# Patient Record
Sex: MALE | Race: WHITE | Employment: OTHER | URBAN - METROPOLITAN AREA
[De-identification: names, ages, dates, MRNs, and addresses within clinical notes are randomized per-mention and may not be internally consistent; named-entity substitution may affect disease eponyms.]

---

## 2017-08-14 ENCOUNTER — TELEPHONE (OUTPATIENT)
Dept: FAMILY MEDICINE CLINIC | Facility: CLINIC | Age: 72
End: 2017-08-14

## 2017-11-22 ENCOUNTER — TELEPHONE (OUTPATIENT)
Dept: FAMILY MEDICINE CLINIC | Facility: CLINIC | Age: 72
End: 2017-11-22

## 2017-11-29 ENCOUNTER — OFFICE VISIT (OUTPATIENT)
Dept: FAMILY MEDICINE CLINIC | Facility: CLINIC | Age: 72
End: 2017-11-29

## 2017-11-29 VITALS
HEART RATE: 76 BPM | BODY MASS INDEX: 30.29 KG/M2 | RESPIRATION RATE: 14 BRPM | WEIGHT: 236 LBS | SYSTOLIC BLOOD PRESSURE: 136 MMHG | DIASTOLIC BLOOD PRESSURE: 78 MMHG | HEIGHT: 74 IN | TEMPERATURE: 99 F

## 2017-11-29 DIAGNOSIS — M1A.09X0 IDIOPATHIC CHRONIC GOUT OF MULTIPLE SITES WITHOUT TOPHUS: ICD-10-CM

## 2017-11-29 DIAGNOSIS — Z00.00 ANNUAL PHYSICAL EXAM: Primary | ICD-10-CM

## 2017-11-29 DIAGNOSIS — Z23 NEED FOR VACCINATION: ICD-10-CM

## 2017-11-29 DIAGNOSIS — C61 PROSTATE CANCER METASTATIC TO LUNG (HCC): ICD-10-CM

## 2017-11-29 DIAGNOSIS — C78.00 PROSTATE CANCER METASTATIC TO LUNG (HCC): ICD-10-CM

## 2017-11-29 DIAGNOSIS — N18.30 CKD (CHRONIC KIDNEY DISEASE) STAGE 3, GFR 30-59 ML/MIN (HCC): ICD-10-CM

## 2017-11-29 DIAGNOSIS — E78.6 LOW HDL (UNDER 40): ICD-10-CM

## 2017-11-29 DIAGNOSIS — Z00.00 ENCOUNTER FOR ANNUAL HEALTH EXAMINATION: ICD-10-CM

## 2017-11-29 PROCEDURE — G0439 PPPS, SUBSEQ VISIT: HCPCS | Performed by: FAMILY MEDICINE

## 2017-11-29 PROCEDURE — G0009 ADMIN PNEUMOCOCCAL VACCINE: HCPCS | Performed by: FAMILY MEDICINE

## 2017-11-29 PROCEDURE — 90670 PCV13 VACCINE IM: CPT | Performed by: FAMILY MEDICINE

## 2017-11-29 NOTE — PATIENT INSTRUCTIONS
Donald Arzola's SCREENING SCHEDULE   Tests on this list are recommended by your physician but may not be covered, or covered at this frequency, by your insurer. Please check with your insurance carrier before scheduling to verify coverage.     PREVENTATI criteria:   • Men who are 73-68 years old and have smoked more than 100 cigarettes in their lifetime   • Anyone with a family history    Colorectal Cancer Screening Covered up to Age 76     Colonoscopy Screen   Covered every 10 years- more often if abnorma retarded   Persons who live in the same house as a HepB virus carrier   Homosexual men   Illicit injectable drug abusers     Tetanus Toxoid- Only covered with a cut with metal- TD and TDaP Not covered by Medicare Part B) No orders found for this or any pre

## 2017-11-29 NOTE — PROGRESS NOTES
HPI:   Nicho King is a 71year old male who presents for a Medicare Subsequent Annual Wellness visit (Pt already had Initial Annual Wellness).     Wife with cancer and lots of stress'  Declined flu and pnuemonia  His last annual assessment has been over Review of Systems   Constitutional: Negative. Negative for activity change, appetite change, chills and fever. HENT: Negative. Eyes: Negative. Respiratory: Negative. Negative for shortness of breath. Cardiovascular: Negative.   Negative for c range of motion. Neck supple. Normal carotid pulses present. No edema present. No thyroid mass and no thyromegaly present. Cardiovascular: Normal rate, regular rhythm, S1 normal, S2 normal, normal heart sounds and intact distal pulses.   Exam reveals no g Gout    Current Assessment & Plan     Stable overall    Lab Results  Component Value Date   URIC 6.6 03/06/2017   CREATSERUM 1.51 (H) 03/06/2017                  Genitourinary    CKD (chronic kidney disease) stage 3, GFR 30-59 ml/min    Overview     GFR in a female age 47-67, do you take aspirin?: (P) No    Have you had any immunizations at another office such as Influenza, Hepatitis B, Tetanus, or Pneumococcal?: (P) No     Functional Ability     Bathing or Showering: (P) Able without help    Toileting: (P) What day of the week is this?: Correct    What month is it?: Correct    What year is it?: Correct    Recall \"Ball\": Correct    Recall \"Flag\": Correct    Recall \"Tree\": Correct       This section provided for quick review of chart, separate sheet to visit. Tetanus No orders found for this or any previous visit.          SPECIFIC DISEASE MONITORING Internal Lab or Procedure External Lab or Procedure   Annual Monitoring of Persistent     Medications (ACE/ARB, digoxin diuretics, anticonvulsants.)    P

## 2017-11-30 NOTE — ASSESSMENT & PLAN NOTE
Stable overall    Lab Results  Component Value Date   URIC 6.6 03/06/2017   CREATSERUM 1.51 (H) 03/06/2017

## 2018-01-15 ENCOUNTER — OFFICE VISIT (OUTPATIENT)
Dept: FAMILY MEDICINE CLINIC | Facility: CLINIC | Age: 73
End: 2018-01-15

## 2018-01-15 VITALS
RESPIRATION RATE: 16 BRPM | HEART RATE: 80 BPM | BODY MASS INDEX: 30 KG/M2 | WEIGHT: 235 LBS | TEMPERATURE: 97 F | DIASTOLIC BLOOD PRESSURE: 72 MMHG | SYSTOLIC BLOOD PRESSURE: 136 MMHG

## 2018-01-15 DIAGNOSIS — L02.11 ABSCESS, NECK: Primary | ICD-10-CM

## 2018-01-15 DIAGNOSIS — L72.3 INFLAMED SEBACEOUS CYST: ICD-10-CM

## 2018-01-15 PROCEDURE — 10060 I&D ABSCESS SIMPLE/SINGLE: CPT | Performed by: FAMILY MEDICINE

## 2018-01-15 PROCEDURE — 99212 OFFICE O/P EST SF 10 MIN: CPT | Performed by: FAMILY MEDICINE

## 2018-01-16 NOTE — PROGRESS NOTES
Patient presents with:  Abscess (integumentary): L side of neck x 2 weeks      HPI:   This is a 71year old male coming in for growing lesion on the left side of his neck, it has been growing over the last 2 weeks, no real pain but is concerned about absce No significant blood loss. Bandages placed. No specimen sent to pathology      Assessment: Inflamed sebaceous cyst with infection, no need for antibiotics at this time will watch closely    Plan: Incision and Drainage performed.      Pathology not sen

## 2018-05-17 ENCOUNTER — TELEPHONE (OUTPATIENT)
Dept: FAMILY MEDICINE CLINIC | Facility: CLINIC | Age: 73
End: 2018-05-17

## 2018-11-12 ENCOUNTER — LAB ENCOUNTER (OUTPATIENT)
Dept: LAB | Age: 73
End: 2018-11-12
Payer: MEDICARE

## 2018-11-12 DIAGNOSIS — Z01.818 PRE-OP EXAM: Primary | ICD-10-CM

## 2018-11-12 PROCEDURE — 80048 BASIC METABOLIC PNL TOTAL CA: CPT

## 2019-03-13 ENCOUNTER — TELEPHONE (OUTPATIENT)
Dept: FAMILY MEDICINE CLINIC | Facility: CLINIC | Age: 74
End: 2019-03-13

## 2019-05-29 ENCOUNTER — LAB ENCOUNTER (OUTPATIENT)
Dept: LAB | Age: 74
End: 2019-05-29
Attending: PHYSICIAN ASSISTANT
Payer: MEDICARE

## 2019-05-29 DIAGNOSIS — K91.1 POSTGASTRIC SURGERY SYNDROMES: ICD-10-CM

## 2019-05-29 DIAGNOSIS — C61 MALIGNANT NEOPLASM OF PROSTATE (HCC): Primary | ICD-10-CM

## 2019-05-29 PROCEDURE — 84520 ASSAY OF UREA NITROGEN: CPT

## 2019-05-29 PROCEDURE — 82565 ASSAY OF CREATININE: CPT

## 2019-07-01 PROBLEM — C78.00 SECONDARY MALIGNANT NEOPLASM OF LUNG (HCC): Status: ACTIVE | Noted: 2018-03-23

## 2019-07-03 ENCOUNTER — OFFICE VISIT (OUTPATIENT)
Dept: FAMILY MEDICINE CLINIC | Facility: CLINIC | Age: 74
End: 2019-07-03
Payer: MEDICARE

## 2019-07-03 VITALS
HEIGHT: 74 IN | WEIGHT: 235 LBS | RESPIRATION RATE: 14 BRPM | HEART RATE: 80 BPM | TEMPERATURE: 98 F | DIASTOLIC BLOOD PRESSURE: 72 MMHG | BODY MASS INDEX: 30.16 KG/M2 | SYSTOLIC BLOOD PRESSURE: 130 MMHG

## 2019-07-03 DIAGNOSIS — Z00.00 ENCOUNTER FOR ANNUAL HEALTH EXAMINATION: ICD-10-CM

## 2019-07-03 DIAGNOSIS — C61 PROSTATE CANCER METASTATIC TO LUNG (HCC): Primary | ICD-10-CM

## 2019-07-03 DIAGNOSIS — N18.30 CKD (CHRONIC KIDNEY DISEASE) STAGE 3, GFR 30-59 ML/MIN (HCC): ICD-10-CM

## 2019-07-03 DIAGNOSIS — C78.00 PROSTATE CANCER METASTATIC TO LUNG (HCC): Primary | ICD-10-CM

## 2019-07-03 DIAGNOSIS — Z23 NEED FOR VACCINATION: ICD-10-CM

## 2019-07-03 DIAGNOSIS — E78.6 LOW HDL (UNDER 40): ICD-10-CM

## 2019-07-03 PROCEDURE — G0439 PPPS, SUBSEQ VISIT: HCPCS | Performed by: FAMILY MEDICINE

## 2019-07-03 PROCEDURE — 90732 PPSV23 VACC 2 YRS+ SUBQ/IM: CPT | Performed by: FAMILY MEDICINE

## 2019-07-03 PROCEDURE — G0009 ADMIN PNEUMOCOCCAL VACCINE: HCPCS | Performed by: FAMILY MEDICINE

## 2019-07-03 NOTE — PROGRESS NOTES
HPI:   Ben Anthony is a 68year old male who presents for a Medicare Subsequent Annual Wellness visit (Pt already had Initial Annual Wellness). Doing well, hx prostate CA x 12 years, got XRT and PSA dropped considerably. PSA has gradually increased. Low HDL (under 40)     CKD (chronic kidney disease) stage 3, GFR 30-59 ml/min (HCC)     Secondary malignant neoplasm of lung ([facility] [address]    Wt Readings from Last 3 Encounters:  07/03/19 : 235 lb  01/15/18 : 235 lb  11/29/17 : 236 lb     Last Cholesterol Labs: Genitourinary: Negative. Negative for dysuria and difficulty urinating. Musculoskeletal: Negative for joint swelling. Skin: Negative. Negative for rash. Neurological: Negative. Negative for dizziness.         EXAM:   /72   Pulse 80   Temp 9 11/29/2017   Pended Date(s) Pended   • Pneumovax 23 07/03/2019   Deferred Date(s) Deferred   • >=3 YRS TRI  MULTIDOSE VIAL (23815) FLU CLINIC 11/29/2017   • Pneumococcal (Prevnar 13) 10/07/2016        ASSESSMENT AND OTHER RELEVANT CHRONIC CONDITIONS:   Trisha Turpin well-being?: Social Interaction;Games;Puzzles; Visiting Friends; Visiting Family    This section provided for quick review of chart, separate sheet to patient  [address] Internal Lab or Procedure External Lab or Procedur for the mentally retarded   Persons who live in the same house as a HepB virus carrier   Homosexual men   Illicit injectable drug abusers     Tetanus Toxoid  Only covered with a cut with metal- TD and TDaP Not covered by Medicare Part B) No vaccine history

## 2019-07-03 NOTE — PATIENT INSTRUCTIONS
Donald Arzola's SCREENING SCHEDULE   Tests on this list are recommended by your physician but may not be covered, or covered at this frequency, by your insurer. Please check with your insurance carrier before scheduling to verify coverage.     PREVENTATI criteria:   • Men who are 73-68 years old and have smoked more than 100 cigarettes in their lifetime   • Anyone with a family history    Colorectal Cancer Screening Covered up to Age 76     Colonoscopy Screen   Covered every 10 years- more often if abnorma institutions for the mentally retarded   Persons who live in the same house as a HepB virus carrier   Homosexual men   Illicit injectable drug abusers     Tetanus Toxoid- Only covered with a cut with metal- TD and TDaP Not covered by Medicare Part B) No or

## 2021-03-12 DIAGNOSIS — Z23 NEED FOR VACCINATION: ICD-10-CM

## 2021-04-08 ENCOUNTER — IMMUNIZATION (OUTPATIENT)
Dept: LAB | Age: 76
End: 2021-04-08
Attending: HOSPITALIST
Payer: MEDICARE

## 2021-04-08 DIAGNOSIS — Z23 NEED FOR VACCINATION: Primary | ICD-10-CM

## 2021-04-08 PROCEDURE — 0001A SARSCOV2 VAC 30MCG/0.3ML IM: CPT

## 2021-04-29 ENCOUNTER — IMMUNIZATION (OUTPATIENT)
Dept: LAB | Age: 76
End: 2021-04-29
Attending: HOSPITALIST
Payer: MEDICARE

## 2021-04-29 DIAGNOSIS — Z23 NEED FOR VACCINATION: Primary | ICD-10-CM

## 2021-04-29 PROCEDURE — 0002A SARSCOV2 VAC 30MCG/0.3ML IM: CPT

## 2021-05-24 ENCOUNTER — TELEPHONE (OUTPATIENT)
Dept: FAMILY MEDICINE CLINIC | Facility: CLINIC | Age: 76
End: 2021-05-24

## 2021-05-24 NOTE — TELEPHONE ENCOUNTER
Pt states he has been having ear pain & ringing, dizziness, and he feels off balance. Pt only wants to see Dr. Roosevelt Kim scheduled pt 05/8/21 @ 8:45 am. Pt is requesting a call back from a nurse regarding his symptoms.

## 2021-05-24 NOTE — TELEPHONE ENCOUNTER
Pt has ringing in ears, dizziness. Advised that he drink plenty of fluids and change positions slowly.     Future Appointments   Date Time Provider Niya Russelli   5/25/2021  1:30 PM Dimple Jimenez MD [facility]     If symptoms worsen, pt is a

## 2021-05-25 ENCOUNTER — OFFICE VISIT (OUTPATIENT)
Dept: FAMILY MEDICINE CLINIC | Facility: CLINIC | Age: 76
End: 2021-05-25
Payer: MEDICARE

## 2021-05-25 VITALS
WEIGHT: 214 LBS | HEIGHT: 74.5 IN | RESPIRATION RATE: 14 BRPM | SYSTOLIC BLOOD PRESSURE: 134 MMHG | HEART RATE: 64 BPM | TEMPERATURE: 98 F | DIASTOLIC BLOOD PRESSURE: 64 MMHG | BODY MASS INDEX: 27.17 KG/M2

## 2021-05-25 DIAGNOSIS — C61 PROSTATE CANCER METASTATIC TO LUNG (HCC): ICD-10-CM

## 2021-05-25 DIAGNOSIS — C78.00 PROSTATE CANCER METASTATIC TO LUNG (HCC): ICD-10-CM

## 2021-05-25 DIAGNOSIS — R23.8 OTHER SKIN CHANGES (CODE): ICD-10-CM

## 2021-05-25 DIAGNOSIS — R42 DIZZINESS: ICD-10-CM

## 2021-05-25 DIAGNOSIS — R73.01 ELEVATED FASTING GLUCOSE: ICD-10-CM

## 2021-05-25 DIAGNOSIS — K62.5 BRBPR (BRIGHT RED BLOOD PER RECTUM): ICD-10-CM

## 2021-05-25 DIAGNOSIS — J34.0 INTRANASAL ULCER: Primary | ICD-10-CM

## 2021-05-25 PROCEDURE — 99214 OFFICE O/P EST MOD 30 MIN: CPT | Performed by: FAMILY MEDICINE

## 2021-05-25 NOTE — PROGRESS NOTES
Patient presents with:  Dizziness: vertigo     HPI:   Fatemeh Fulton is a 77year old male with h/o metastatic prostate CA in treatment with Debbora Manner who presents to the office for eval of dizziness.  Was sitting at computer Sat morning and had a buzzing/ri alert, well appearing, and in no distress and normal appearing weight  Ears - R TM and ear normal.  L TM with minimal fluid around inner ear drum, some evidence of pressure pushing TM outward.    Nose - L inferior nares - small ulceration about 0.5cm away f

## 2021-05-27 ENCOUNTER — LAB ENCOUNTER (OUTPATIENT)
Dept: LAB | Facility: HOSPITAL | Age: 76
End: 2021-05-27
Attending: FAMILY MEDICINE
Payer: MEDICARE

## 2021-05-27 DIAGNOSIS — C61 PROSTATE CANCER METASTATIC TO LUNG (HCC): ICD-10-CM

## 2021-05-27 DIAGNOSIS — C78.00 PROSTATE CANCER METASTATIC TO LUNG (HCC): ICD-10-CM

## 2021-05-27 DIAGNOSIS — K62.5 BRBPR (BRIGHT RED BLOOD PER RECTUM): ICD-10-CM

## 2021-05-27 DIAGNOSIS — R23.8 OTHER SKIN CHANGES (CODE): ICD-10-CM

## 2021-05-27 DIAGNOSIS — R42 DIZZINESS: ICD-10-CM

## 2021-05-27 DIAGNOSIS — R73.01 ELEVATED FASTING GLUCOSE: ICD-10-CM

## 2021-05-27 PROCEDURE — 84153 ASSAY OF PSA TOTAL: CPT

## 2021-05-27 PROCEDURE — 83036 HEMOGLOBIN GLYCOSYLATED A1C: CPT

## 2021-05-27 PROCEDURE — 84439 ASSAY OF FREE THYROXINE: CPT

## 2021-05-27 PROCEDURE — 85025 COMPLETE CBC W/AUTO DIFF WBC: CPT

## 2021-05-27 PROCEDURE — 84443 ASSAY THYROID STIM HORMONE: CPT

## 2021-05-27 PROCEDURE — 36415 COLL VENOUS BLD VENIPUNCTURE: CPT

## 2021-05-27 PROCEDURE — 80053 COMPREHEN METABOLIC PANEL: CPT

## 2021-06-10 ENCOUNTER — LAB ENCOUNTER (OUTPATIENT)
Dept: LAB | Facility: HOSPITAL | Age: 76
End: 2021-06-10
Attending: FAMILY MEDICINE
Payer: MEDICARE

## 2021-06-10 DIAGNOSIS — E03.9 HYPOTHYROIDISM, UNSPECIFIED TYPE: ICD-10-CM

## 2021-06-10 DIAGNOSIS — D61.818 PANCYTOPENIA (HCC): ICD-10-CM

## 2021-06-10 DIAGNOSIS — R79.89 ABNORMAL TSH: ICD-10-CM

## 2021-06-10 PROCEDURE — 84443 ASSAY THYROID STIM HORMONE: CPT

## 2021-06-10 PROCEDURE — 85025 COMPLETE CBC W/AUTO DIFF WBC: CPT

## 2021-06-10 PROCEDURE — 84439 ASSAY OF FREE THYROXINE: CPT

## 2021-06-10 PROCEDURE — 36415 COLL VENOUS BLD VENIPUNCTURE: CPT

## 2021-06-11 ENCOUNTER — TELEPHONE (OUTPATIENT)
Dept: FAMILY MEDICINE CLINIC | Facility: CLINIC | Age: 76
End: 2021-06-11

## 2021-06-11 DIAGNOSIS — E03.9 ACQUIRED HYPOTHYROIDISM: ICD-10-CM

## 2021-06-11 NOTE — TELEPHONE ENCOUNTER
patient called wanted Dr John Medina to know he has metastatic prostrate cancer and is on lupron to suppress testosterone.  He read that thyroid medication is linkes to testosterone and he does not want to take any medication that might interfere with his cancer

## 2021-06-15 RX ORDER — LEVOTHYROXINE SODIUM 0.05 MG/1
TABLET ORAL
Qty: 90 TABLET | Refills: 0 | OUTPATIENT
Start: 2021-06-15

## 2021-08-18 ENCOUNTER — TELEPHONE (OUTPATIENT)
Dept: FAMILY MEDICINE CLINIC | Facility: CLINIC | Age: 76
End: 2021-08-18

## 2021-08-18 NOTE — TELEPHONE ENCOUNTER
C/o soreness in left side of chest for last month DX 13 yrs ago with prostate ca radiation reduced the size. Then put on lupron and PSA decreased then put on neubiqua and prostate went down.  Did scan of chest and they found spots that they treated with rad

## 2021-08-18 NOTE — TELEPHONE ENCOUNTER
Pt requested Dr Chaka Sarabia and scheduled an appt on 9/10/21 for soreness in chest area. .. possible side effect. Requesting to speak to a nurse if ok to wait until then?

## 2021-08-20 ENCOUNTER — OFFICE VISIT (OUTPATIENT)
Dept: FAMILY MEDICINE CLINIC | Facility: CLINIC | Age: 76
End: 2021-08-20
Payer: MEDICARE

## 2021-08-20 VITALS
DIASTOLIC BLOOD PRESSURE: 78 MMHG | WEIGHT: 215.38 LBS | HEIGHT: 74 IN | SYSTOLIC BLOOD PRESSURE: 132 MMHG | BODY MASS INDEX: 27.64 KG/M2 | RESPIRATION RATE: 14 BRPM | HEART RATE: 74 BPM

## 2021-08-20 DIAGNOSIS — Z92.29 HISTORY OF HORMONE THERAPY: ICD-10-CM

## 2021-08-20 DIAGNOSIS — N63.20 LEFT BREAST LUMP: Primary | ICD-10-CM

## 2021-08-20 DIAGNOSIS — C61 PROSTATE CANCER METASTATIC TO LUNG (HCC): ICD-10-CM

## 2021-08-20 DIAGNOSIS — N62 GYNECOMASTIA, MALE: ICD-10-CM

## 2021-08-20 DIAGNOSIS — C78.00 PROSTATE CANCER METASTATIC TO LUNG (HCC): ICD-10-CM

## 2021-08-20 PROCEDURE — 99214 OFFICE O/P EST MOD 30 MIN: CPT | Performed by: FAMILY MEDICINE

## 2021-08-20 NOTE — PROGRESS NOTES
Jcarlos Mcguire is a 77year old male coming in for had concerns including Chest Pain (started a month ago, on left nipple area). HPI/Subjective:   HPI chest pain. Hx lupron for prostate, it lost effectiveness  1 month ago pain.    CT from December 2019 is reviewed

## 2021-08-25 ENCOUNTER — HOSPITAL ENCOUNTER (OUTPATIENT)
Dept: MAMMOGRAPHY | Facility: HOSPITAL | Age: 76
Discharge: HOME | End: 2021-08-25
Attending: FAMILY MEDICINE
Payer: MEDICARE

## 2021-08-25 DIAGNOSIS — Z92.29 HISTORY OF HORMONE THERAPY: ICD-10-CM

## 2021-08-25 DIAGNOSIS — N62 GYNECOMASTIA, MALE: ICD-10-CM

## 2021-08-25 DIAGNOSIS — N63.20 LEFT BREAST LUMP: ICD-10-CM

## 2021-08-25 PROCEDURE — 77066 DX MAMMO INCL CAD BI: CPT | Performed by: FAMILY MEDICINE

## 2021-08-25 PROCEDURE — 77062 BREAST TOMOSYNTHESIS BI: CPT | Performed by: FAMILY MEDICINE

## 2021-08-28 ENCOUNTER — TELEPHONE (OUTPATIENT)
Dept: FAMILY MEDICINE CLINIC | Facility: CLINIC | Age: 76
End: 2021-08-28

## 2021-08-28 NOTE — TELEPHONE ENCOUNTER
CT department called patient coming in for CT of chest and abd on Monday at 8:30 and they wanted to know if Dr Braden Aguiar wanted to do chest abdomin and pelvis like was ordered last time instead?

## 2021-08-30 ENCOUNTER — HOSPITAL ENCOUNTER (OUTPATIENT)
Dept: CT IMAGING | Facility: HOSPITAL | Age: 76
Discharge: HOME | End: 2021-08-30
Attending: FAMILY MEDICINE
Payer: MEDICARE

## 2021-08-30 DIAGNOSIS — C61 PROSTATE CANCER METASTATIC TO LUNG (HCC): ICD-10-CM

## 2021-08-30 DIAGNOSIS — C78.00 PROSTATE CANCER METASTATIC TO LUNG (HCC): ICD-10-CM

## 2021-08-30 LAB — CREAT BLD-MCNC: 1.2 MG/DL

## 2021-08-30 PROCEDURE — 71260 CT THORAX DX C+: CPT | Performed by: FAMILY MEDICINE

## 2021-08-30 PROCEDURE — 74160 CT ABDOMEN W/CONTRAST: CPT | Performed by: FAMILY MEDICINE

## 2021-08-30 PROCEDURE — 82565 ASSAY OF CREATININE: CPT

## 2021-09-27 ENCOUNTER — TELEPHONE (OUTPATIENT)
Dept: FAMILY MEDICINE CLINIC | Facility: CLINIC | Age: 76
End: 2021-09-27

## 2021-09-27 NOTE — TELEPHONE ENCOUNTER
Pt fell 2 days ago and  did have left side pain but today having R lower back pain. Requesting an appt with Dr Atif Garvin.  An appt was offered for Wed but he is wanting to speak to a nurse

## 2021-09-27 NOTE — TELEPHONE ENCOUNTER
Pt fell 2 days ago outside and fell onto L side/shoulder area. Pt woke today with low back pain on R side. Pt took Advil and help to lessen pain to manageable.  Pt states pain initially felt like the pain he had with kidney stones but then pain moved to ada

## 2021-09-27 NOTE — TELEPHONE ENCOUNTER
I agree with the management from triage. We can look at a low-dose muscle relaxants like baclofen if no improvement but that medication can increase the risk of falling as well as hospitalization.   If it worsens we can look at a visit to urgent care

## 2021-10-01 ENCOUNTER — HOSPITAL ENCOUNTER (OUTPATIENT)
Dept: CT IMAGING | Age: 76
Discharge: HOME | End: 2021-10-01
Attending: INTERNAL MEDICINE
Payer: MEDICARE

## 2021-10-01 DIAGNOSIS — K59.00 CONSTIPATION, UNSPECIFIED CONSTIPATION TYPE: ICD-10-CM

## 2021-10-01 PROCEDURE — 74176 CT ABD & PELVIS W/O CONTRAST: CPT | Performed by: INTERNAL MEDICINE

## 2021-10-01 PROCEDURE — 82565 ASSAY OF CREATININE: CPT

## 2021-10-01 NOTE — PROGRESS NOTES
Pt with Cr of 2.2 from a baseline of 1.2 last month. Given this acute change and YUNG as well as obstructive sx, will advise to go to ED for evaluation.

## 2021-11-26 ENCOUNTER — LAB ENCOUNTER (OUTPATIENT)
Dept: LAB | Facility: HOSPITAL | Age: 76
End: 2021-11-26
Attending: FAMILY MEDICINE
Payer: MEDICARE

## 2021-11-26 DIAGNOSIS — Z12.5 PROSTATE CANCER SCREENING: Primary | ICD-10-CM

## 2021-11-26 DIAGNOSIS — E03.9 ACQUIRED HYPOTHYROIDISM: ICD-10-CM

## 2021-11-26 PROCEDURE — 84154 ASSAY OF PSA FREE: CPT

## 2021-11-26 PROCEDURE — 84443 ASSAY THYROID STIM HORMONE: CPT

## 2021-11-26 PROCEDURE — 84153 ASSAY OF PSA TOTAL: CPT

## 2021-11-26 PROCEDURE — 85025 COMPLETE CBC W/AUTO DIFF WBC: CPT

## 2021-11-26 PROCEDURE — 36415 COLL VENOUS BLD VENIPUNCTURE: CPT

## 2021-11-26 PROCEDURE — 84439 ASSAY OF FREE THYROXINE: CPT

## 2021-11-26 PROCEDURE — 80053 COMPREHEN METABOLIC PANEL: CPT

## 2021-12-21 ENCOUNTER — LAB ENCOUNTER (OUTPATIENT)
Dept: LAB | Facility: HOSPITAL | Age: 76
End: 2021-12-21
Attending: INTERNAL MEDICINE
Payer: MEDICARE

## 2021-12-21 DIAGNOSIS — C61 MALIGNANT NEOPLASM OF PROSTATE (HCC): Primary | ICD-10-CM

## 2021-12-21 PROCEDURE — 84403 ASSAY OF TOTAL TESTOSTERONE: CPT

## 2021-12-21 PROCEDURE — 36415 COLL VENOUS BLD VENIPUNCTURE: CPT

## 2022-02-15 ENCOUNTER — LAB ENCOUNTER (OUTPATIENT)
Dept: LAB | Facility: HOSPITAL | Age: 77
End: 2022-02-15
Payer: MEDICARE

## 2022-02-15 DIAGNOSIS — C61 MALIGNANT NEOPLASM OF PROSTATE (HCC): Primary | ICD-10-CM

## 2022-02-15 LAB
ALBUMIN SERPL-MCNC: 3.8 G/DL (ref 3.4–5)
ALBUMIN/GLOB SERPL: 1.3 {RATIO} (ref 1–2)
ALP LIVER SERPL-CCNC: 67 U/L
ALT SERPL-CCNC: 32 U/L
ANION GAP SERPL CALC-SCNC: 3 MMOL/L (ref 0–18)
AST SERPL-CCNC: 19 U/L (ref 15–37)
BASOPHILS # BLD AUTO: 0.03 X10(3) UL (ref 0–0.2)
BASOPHILS NFR BLD AUTO: 0.9 %
BILIRUB SERPL-MCNC: 0.4 MG/DL (ref 0.1–2)
BUN BLD-MCNC: 30 MG/DL (ref 7–18)
CALCIUM BLD-MCNC: 8.8 MG/DL (ref 8.5–10.1)
CHLORIDE SERPL-SCNC: 110 MMOL/L (ref 98–112)
CO2 SERPL-SCNC: 30 MMOL/L (ref 21–32)
CREAT BLD-MCNC: 1.15 MG/DL
EOSINOPHIL NFR BLD AUTO: 2.7 %
ERYTHROCYTE [DISTWIDTH] IN BLOOD BY AUTOMATED COUNT: 12.2 %
FASTING STATUS PATIENT QL REPORTED: NO
GLOBULIN PLAS-MCNC: 2.9 G/DL (ref 2.8–4.4)
GLUCOSE BLD-MCNC: 83 MG/DL (ref 70–99)
HCT VFR BLD AUTO: 36.2 %
HGB BLD-MCNC: 11.6 G/DL
IMM GRANULOCYTES # BLD AUTO: 0.01 X10(3) UL (ref 0–1)
IMM GRANULOCYTES NFR BLD: 0.3 %
LYMPHOCYTES # BLD AUTO: 0.97 X10(3) UL (ref 1–4)
LYMPHOCYTES NFR BLD AUTO: 28.7 %
MCH RBC QN AUTO: 30.4 PG (ref 26–34)
MCHC RBC AUTO-ENTMCNC: 32 G/DL (ref 31–37)
MCV RBC AUTO: 95 FL
MONOCYTES # BLD AUTO: 0.41 X10(3) UL (ref 0.1–1)
MONOCYTES NFR BLD AUTO: 12.1 %
NEUTROPHILS # BLD AUTO: 1.87 X10 (3) UL (ref 1.5–7.7)
NEUTROPHILS # BLD AUTO: 1.87 X10(3) UL (ref 1.5–7.7)
NEUTROPHILS NFR BLD AUTO: 55.3 %
OSMOLALITY SERPL CALC.SUM OF ELEC: 301 MOSM/KG (ref 275–295)
PLATELET # BLD AUTO: 153 10(3)UL (ref 150–450)
POTASSIUM SERPL-SCNC: 4.7 MMOL/L (ref 3.5–5.1)
PSA FREE SERPL-MCNC: <0.02 NG/ML
PSA SERPL-MCNC: 0.21 NG/ML (ref ?–4)
RBC # BLD AUTO: 3.81 X10(6)UL
SODIUM SERPL-SCNC: 143 MMOL/L (ref 136–145)
WBC # BLD AUTO: 3.4 X10(3) UL (ref 4–11)

## 2022-02-15 PROCEDURE — 84153 ASSAY OF PSA TOTAL: CPT

## 2022-02-15 PROCEDURE — 85025 COMPLETE CBC W/AUTO DIFF WBC: CPT

## 2022-02-15 PROCEDURE — 80053 COMPREHEN METABOLIC PANEL: CPT

## 2022-02-15 PROCEDURE — 84154 ASSAY OF PSA FREE: CPT

## 2022-02-15 PROCEDURE — 36415 COLL VENOUS BLD VENIPUNCTURE: CPT

## 2022-07-28 ENCOUNTER — TELEPHONE (OUTPATIENT)
Dept: FAMILY MEDICINE CLINIC | Facility: CLINIC | Age: 77
End: 2022-07-28

## 2022-07-28 DIAGNOSIS — C78.00 PROSTATE CANCER METASTATIC TO LUNG (HCC): ICD-10-CM

## 2022-07-28 DIAGNOSIS — M1A.09X0 IDIOPATHIC CHRONIC GOUT OF MULTIPLE SITES WITHOUT TOPHUS: Primary | ICD-10-CM

## 2022-07-28 DIAGNOSIS — C61 PROSTATE CANCER METASTATIC TO LUNG (HCC): ICD-10-CM

## 2022-07-28 DIAGNOSIS — Z00.00 LABORATORY EXAMINATION ORDERED AS PART OF A ROUTINE GENERAL MEDICAL EXAMINATION: ICD-10-CM

## 2022-07-28 DIAGNOSIS — E78.6 LOW HDL (UNDER 40): ICD-10-CM

## 2022-07-28 DIAGNOSIS — N18.32 STAGE 3B CHRONIC KIDNEY DISEASE (HCC): ICD-10-CM

## 2022-07-28 NOTE — TELEPHONE ENCOUNTER
Please enter lab orders for the patient's upcoming physical appointment. Physical scheduled: Your appointments     Date & Time Appointment Department [facility])    Sep 14, 2022  2:30 PM CDT Medicare Annual Well Visit with Anthony Fletcher MD [facility], [address], [address] [facility]  (Marianela Records)            [address] [facility] [phone]         Preferred lab: [facility])     [id number] .. Grace Castellon Pt requesting to discuss labs at his visit

## 2022-08-15 NOTE — TELEPHONE ENCOUNTER
1. Idiopathic chronic gout of multiple sites without tophus (Primary)  2. 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 Stage 3b chronic kidney disease ([facility] [address]  Overview:  GFR in the 50s, normal blood pressure and no diabetes. History of gout  4. Low HDL (under 40)  Overview:  HDL 35 2013  5. Laboratory examination ordered as part of a routine general medical examination  -     Comp Metabolic Panel (14); Future; Expected date: 08/15/2022  -     Lipid Panel; Future; Expected date: 08/15/2022  -     CBC, Platelet; No Differential; Future; Expected date: 08/15/2022  -     TSH W Reflex To Free T4; Future; Expected date: 08/15/2022       OK to notify.  Thanks, Diego Ceballos MD

## 2022-09-09 ENCOUNTER — TELEPHONE (OUTPATIENT)
Dept: FAMILY MEDICINE CLINIC | Facility: CLINIC | Age: 77
End: 2022-09-09

## 2022-09-13 NOTE — ASSESSMENT & PLAN NOTE
Lab Results   Component Value Date/Time    CREATSERUM 1.15 02/15/2022 01:13 PM    GFRNAA 62 02/15/2022 01:13 PM      Stable, continue present management

## 2022-09-14 ENCOUNTER — OFFICE VISIT (OUTPATIENT)
Dept: FAMILY MEDICINE CLINIC | Facility: CLINIC | Age: 77
End: 2022-09-14
Payer: MEDICARE

## 2022-09-14 VITALS
HEIGHT: 74 IN | BODY MASS INDEX: 27.59 KG/M2 | HEART RATE: 76 BPM | RESPIRATION RATE: 18 BRPM | DIASTOLIC BLOOD PRESSURE: 70 MMHG | WEIGHT: 215 LBS | SYSTOLIC BLOOD PRESSURE: 130 MMHG

## 2022-09-14 DIAGNOSIS — D61.818 PANCYTOPENIA (HCC): ICD-10-CM

## 2022-09-14 DIAGNOSIS — E78.6 LOW HDL (UNDER 40): ICD-10-CM

## 2022-09-14 DIAGNOSIS — C61 PROSTATE CANCER METASTATIC TO LUNG (HCC): ICD-10-CM

## 2022-09-14 DIAGNOSIS — Z00.00 ENCOUNTER FOR ANNUAL HEALTH EXAMINATION: ICD-10-CM

## 2022-09-14 DIAGNOSIS — C78.00 PROSTATE CANCER METASTATIC TO LUNG (HCC): ICD-10-CM

## 2022-09-14 DIAGNOSIS — C78.00 MALIGNANT NEOPLASM METASTATIC TO LUNG, UNSPECIFIED LATERALITY (HCC): ICD-10-CM

## 2022-09-14 DIAGNOSIS — N18.32 STAGE 3B CHRONIC KIDNEY DISEASE (HCC): ICD-10-CM

## 2022-09-14 DIAGNOSIS — Z00.00 ANNUAL PHYSICAL EXAM: Primary | ICD-10-CM

## 2022-09-14 PROCEDURE — 1126F AMNT PAIN NOTED NONE PRSNT: CPT | Performed by: FAMILY MEDICINE

## 2022-09-14 PROCEDURE — G0439 PPPS, SUBSEQ VISIT: HCPCS | Performed by: FAMILY MEDICINE

## 2023-01-06 ENCOUNTER — HOSPITAL ENCOUNTER (OUTPATIENT)
Age: 78
Discharge: HOME | End: 2023-01-06
Payer: MEDICARE

## 2023-01-06 VITALS
DIASTOLIC BLOOD PRESSURE: 78 MMHG | OXYGEN SATURATION: 98 % | SYSTOLIC BLOOD PRESSURE: 140 MMHG | WEIGHT: 215 LBS | HEIGHT: 74 IN | BODY MASS INDEX: 27.59 KG/M2 | TEMPERATURE: 98 F | HEART RATE: 81 BPM | RESPIRATION RATE: 17 BRPM

## 2023-01-06 DIAGNOSIS — B35.4 TINEA CORPORIS: Primary | ICD-10-CM

## 2023-01-06 PROCEDURE — 99203 OFFICE O/P NEW LOW 30 MIN: CPT | Performed by: PHYSICIAN ASSISTANT

## 2023-01-06 RX ORDER — CLOTRIMAZOLE AND BETAMETHASONE DIPROPIONATE 10; .64 MG/G; MG/G
1 CREAM TOPICAL 2 TIMES DAILY
Qty: 1 EACH | Refills: 0 | Status: SHIPPED | OUTPATIENT
Start: 2023-01-06

## 2023-01-06 NOTE — DISCHARGE INSTRUCTIONS
Please return to the ER/clinic if symptoms worsen. Follow-up with your PCP in 24-48 hours as needed. Avoid picking or scratching at the area because it can be contagious. Use a cream twice daily as prescribed. It will take quite a while to go away fungal infections are very tenacious. Recommend covering the area with fingerless gloves. Recommend getting a dander shampoo for the pets etc.  If symptoms persist or worsen make a follow-up appoint with dermatology for further evaluation and treatment.

## 2023-09-05 ENCOUNTER — TELEPHONE (OUTPATIENT)
Dept: FAMILY MEDICINE CLINIC | Facility: CLINIC | Age: 78
End: 2023-09-05

## 2023-09-05 DIAGNOSIS — E78.6 LOW HDL (UNDER 40): Primary | ICD-10-CM

## 2023-09-05 DIAGNOSIS — Z00.00 LABORATORY EXAMINATION ORDERED AS PART OF A ROUTINE GENERAL MEDICAL EXAMINATION: ICD-10-CM

## 2023-09-05 DIAGNOSIS — R53.1 WEAKNESS: ICD-10-CM

## 2023-09-05 DIAGNOSIS — Z11.59 ENCOUNTER FOR HEPATITIS C SCREENING TEST FOR LOW RISK PATIENT: ICD-10-CM

## 2023-09-05 DIAGNOSIS — C78.00 PROSTATE CANCER METASTATIC TO LUNG (HCC): ICD-10-CM

## 2023-09-05 DIAGNOSIS — C61 PROSTATE CANCER METASTATIC TO LUNG (HCC): ICD-10-CM

## 2023-09-05 DIAGNOSIS — M1A.09X0 IDIOPATHIC CHRONIC GOUT OF MULTIPLE SITES WITHOUT TOPHUS: ICD-10-CM

## 2023-09-05 DIAGNOSIS — R73.9 HYPERGLYCEMIA: ICD-10-CM

## 2023-09-05 DIAGNOSIS — N18.32 STAGE 3B CHRONIC KIDNEY DISEASE (HCC): ICD-10-CM

## 2023-09-05 NOTE — TELEPHONE ENCOUNTER
Please enter lab orders for the patient's upcoming physical appointment. Physical scheduled: Your appointments       Date & Time Appointment Department [facility])    Sep 15, 2023  1:30 PM CDT Medicare Annual Well Visit with Nitin Alejandre MD [address], [address], [place] ([address])              [address], [address] [address] [place] [address] [phone]           Preferred lab: [facility])     The patient has been notified to complete fasting labs prior to their physical appointment.

## 2023-09-05 NOTE — TELEPHONE ENCOUNTER
1. Low HDL (under 40) (Primary)  Overview:  HDL 35 2013  Orders:  -     TSH W Reflex To Free T4; Future; Expected date: 09/05/2023  -     Lipid Panel; Future; Expected date: 09/05/2023  -     Comp Metabolic Panel (14); Future; Expected date: 09/05/2023  2. Idiopathic chronic gout of multiple sites without tophus  -     TSH W Reflex To Free T4; Future; Expected date: 09/05/2023  -     CBC, Platelet; No Differential; Future; Expected date: 09/05/2023  -     Uric Acid; Future; Expected date: 09/05/2023  3. Stage 3b chronic kidney disease (HCC)  Overview:  GFR in the 50s, normal blood pressure and no diabetes. History of gout  Orders:  -     TSH W Reflex To Free T4; Future; Expected date: 09/05/2023  -     CBC, Platelet; No Differential; Future; Expected date: 09/05/2023  -     Comp Metabolic Panel (14); Future; Expected date: 09/05/2023  4. 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 Weakness  -     TSH W Reflex To Free T4; Future; Expected date: 09/05/2023  6. Laboratory examination ordered as part of a routine general medical examination  -     TSH W Reflex To Free T4; Future; Expected date: 09/05/2023  -     Lipid Panel; Future; Expected date: 09/05/2023  -     CBC, Platelet; No Differential; Future; Expected date: 09/05/2023  -     Comp Metabolic Panel (14); Future; Expected date: 09/05/2023  7. Encounter for hepatitis C screening test for low risk patient  -     HCV Antibody; Future; Expected date: 09/05/2023  8. Hyperglycemia  -     Hemoglobin A1C; Future; Expected date: 09/05/2023       OK to notify.  Thanks, Yeyo Ma MD

## 2023-09-15 ENCOUNTER — OFFICE VISIT (OUTPATIENT)
Dept: FAMILY MEDICINE CLINIC | Facility: CLINIC | Age: 78
End: 2023-09-15
Payer: MEDICARE

## 2023-09-15 VITALS
HEART RATE: 86 BPM | WEIGHT: 215 LBS | RESPIRATION RATE: 16 BRPM | BODY MASS INDEX: 27.59 KG/M2 | DIASTOLIC BLOOD PRESSURE: 70 MMHG | SYSTOLIC BLOOD PRESSURE: 130 MMHG | HEIGHT: 74 IN

## 2023-09-15 DIAGNOSIS — E78.6 LOW HDL (UNDER 40): ICD-10-CM

## 2023-09-15 DIAGNOSIS — D61.818 PANCYTOPENIA (HCC): ICD-10-CM

## 2023-09-15 DIAGNOSIS — M1A.09X0 IDIOPATHIC CHRONIC GOUT OF MULTIPLE SITES WITHOUT TOPHUS: ICD-10-CM

## 2023-09-15 DIAGNOSIS — Z12.11 SCREEN FOR COLON CANCER: ICD-10-CM

## 2023-09-15 DIAGNOSIS — Z00.00 ANNUAL PHYSICAL EXAM: Primary | ICD-10-CM

## 2023-09-15 DIAGNOSIS — Z00.00 ENCOUNTER FOR ANNUAL HEALTH EXAMINATION: ICD-10-CM

## 2023-09-15 DIAGNOSIS — C78.00 PROSTATE CANCER METASTATIC TO LUNG (HCC): ICD-10-CM

## 2023-09-15 DIAGNOSIS — C61 PROSTATE CANCER METASTATIC TO LUNG (HCC): ICD-10-CM

## 2023-09-15 DIAGNOSIS — Z79.818 USE OF LEUPROLIDE ACETATE (LUPRON): ICD-10-CM

## 2023-09-15 DIAGNOSIS — N18.32 STAGE 3B CHRONIC KIDNEY DISEASE (HCC): ICD-10-CM

## 2023-09-15 PROCEDURE — 99214 OFFICE O/P EST MOD 30 MIN: CPT | Performed by: FAMILY MEDICINE

## 2023-09-15 PROCEDURE — G0439 PPPS, SUBSEQ VISIT: HCPCS | Performed by: FAMILY MEDICINE

## 2023-09-15 RX ORDER — TRIAMCINOLONE ACETONIDE 1 MG/G
CREAM TOPICAL
COMMUNITY
Start: 2023-02-23

## 2023-11-21 ENCOUNTER — HOSPITAL ENCOUNTER (OUTPATIENT)
Dept: BONE DENSITY | Age: 78
Discharge: HOME | End: 2023-11-21
Attending: FAMILY MEDICINE
Payer: MEDICARE

## 2023-11-21 DIAGNOSIS — C78.00 PROSTATE CANCER METASTATIC TO LUNG (HCC): ICD-10-CM

## 2023-11-21 DIAGNOSIS — C61 PROSTATE CANCER METASTATIC TO LUNG (HCC): ICD-10-CM

## 2023-11-21 DIAGNOSIS — Z79.818 USE OF LEUPROLIDE ACETATE (LUPRON): ICD-10-CM

## 2023-11-21 PROCEDURE — 77080 DXA BONE DENSITY AXIAL: CPT | Performed by: FAMILY MEDICINE

## 2024-09-13 NOTE — ASSESSMENT & PLAN NOTE
Cholesterol shows Good control. Long term heart-healthy diet and lifestyle discussed and encouraged to reduce risk of cardiovascular disease.  No results found for requested labs within last 1833 days 8 hours.  No current Cholesterol medication. stable  Continue with current treatment plan

## 2024-09-13 NOTE — ASSESSMENT & PLAN NOTE
Gout shows Good control.  Uric Acid: 6.6, done on 3/6/2017.  Last Cr was 1.15 done on 2/15/2022.  Last GFR (REYNA) was 62 done on 2/15/2022.

## 2024-09-13 NOTE — ASSESSMENT & PLAN NOTE
2/15/2022: WBC 3.4 (L); HGB 11.6 (L); .0; MCV 95.0 still low hgb and wbc due to cancer. Continue to follow with oncology

## 2024-09-13 NOTE — ASSESSMENT & PLAN NOTE
Last Glomerular Filtration Rate:  . 2/15/2022: Creatinine 1.15 usually GFR in 40's.  2/15/2022: Creatinine 1.15 CKD etiologies : Hypertension  Plan/Management: Control Hypertension/Diabetes

## 2024-09-13 NOTE — ASSESSMENT & PLAN NOTE
Guy Corbin M.D. kimberly at [facility]. Stable, continue present management and continue to monitor for progression

## 2024-09-16 ENCOUNTER — OFFICE VISIT (OUTPATIENT)
Dept: FAMILY MEDICINE CLINIC | Facility: CLINIC | Age: 79
End: 2024-09-16
Payer: MEDICARE

## 2024-09-16 VITALS
SYSTOLIC BLOOD PRESSURE: 134 MMHG | HEART RATE: 78 BPM | BODY MASS INDEX: 28.88 KG/M2 | WEIGHT: 225 LBS | HEIGHT: 74 IN | DIASTOLIC BLOOD PRESSURE: 68 MMHG | RESPIRATION RATE: 14 BRPM

## 2024-09-16 DIAGNOSIS — Z00.00 ENCOUNTER FOR ANNUAL HEALTH EXAMINATION: ICD-10-CM

## 2024-09-16 DIAGNOSIS — M1A.09X0 IDIOPATHIC CHRONIC GOUT OF MULTIPLE SITES WITHOUT TOPHUS: ICD-10-CM

## 2024-09-16 DIAGNOSIS — E07.9 THYROID CONDITION: ICD-10-CM

## 2024-09-16 DIAGNOSIS — N18.32 STAGE 3B CHRONIC KIDNEY DISEASE (HCC): ICD-10-CM

## 2024-09-16 DIAGNOSIS — E78.6 LOW HDL (UNDER 40): ICD-10-CM

## 2024-09-16 DIAGNOSIS — Z12.5 SCREENING PSA (PROSTATE SPECIFIC ANTIGEN): ICD-10-CM

## 2024-09-16 DIAGNOSIS — C61 PROSTATE CANCER METASTATIC TO LUNG (HCC): ICD-10-CM

## 2024-09-16 DIAGNOSIS — D61.818 PANCYTOPENIA (HCC): ICD-10-CM

## 2024-09-16 DIAGNOSIS — Z00.00 ANNUAL PHYSICAL EXAM: Primary | ICD-10-CM

## 2024-09-16 DIAGNOSIS — Z11.59 SCREENING FOR VIRAL DISEASE: ICD-10-CM

## 2024-09-16 DIAGNOSIS — C78.00 PROSTATE CANCER METASTATIC TO LUNG (HCC): ICD-10-CM

## 2024-09-16 NOTE — PROGRESS NOTES
Subjective:   Donald Arzola is a 78 year old male who presents for a Subsequent Annual Wellness visit (Pt already had Initial Annual Wellness) and scheduled follow up of multiple significant but stable problems.   Doing OK< seeing [facility] for prostate CA. Stable CKD3a.     History/Other:   Fall Risk Assessment:   He has been screened for Falls and is low risk.      Cognitive Assessment:   He had a completely normal cognitive assessment - see flowsheet entries     Functional Ability/Status:   Donald Arzola has some abnormal functions as listed below:  He has Vision problems based on screening of functional status.       Depression Screening (PHQ):  PHQ-2 SCORE: 0  , done 9/16/2024   If you checked off any problems, how difficult have these problems made it for you to do your work, take care of things at home, or get along with other people?: Not difficult at all         Advanced Directives:   He does have a Living Will but we do NOT have it on file in Epic.    He does have a POA but we do NOT have it on file in Epic.    Discussed Advance Care Planning with patient (and family/surrogate if present). Standard forms made available to patient in After Visit Summary.      Patient Active Problem List   Diagnosis    Gout    Internal hemorrhoids    Diverticulosis    Prostate cancer metastatic to lung (HCC)    Low HDL (under 40)    CKD (chronic kidney disease) stage 3, GFR 30-59 ml/min (HCC)    Secondary malignant neoplasm of lung (HCC)    Pancytopenia (HCC)     Allergies:  He has No Known Allergies.    Current Medications:  Outpatient Medications Marked as Taking for the 9/16/24 encounter (Office Visit) with Reggie Johnson MD   Medication Sig    triamcinolone 0.1 % External Cream Apply twice a day to the red scaly areas on the hands    Leuprolide Acetate, 3 Month, (LUPRON DEPOT, 3-MONTH, IM)     Darolutamide 300 MG Oral Tab Take 600 mg by mouth 2 (two) times daily.       Medical History:  He  has a past medical history of  Abdominal pain, Acute gout, Calculus of kidney, Constipation, Hemorrhoids, Loss of appetite, Prostate cancer (HCC), Skin blushing/flushing, Wears glasses, and Weight loss.  Surgical History:  He  has a past surgical history that includes colonoscopy (2008); other surgical history (2007); laparoscopic lymphadenectomy (4/24/2008); other surgical history (4/24/2008); remove tonsils/adenoids,13+ y/o (1964); and colonoscopy.   Family History:  His family history includes Diabetes in his brother; Heart Attack in his father; Hypertension in his mother; alzheimer's in his mother.  Social History:  He  reports that he has never smoked. He has never used smokeless tobacco. He reports current alcohol use. He reports current drug use.    Tobacco:  He has never smoked tobacco.    CAGE Alcohol Screen:   CAGE screening score of 0 on 9/16/2024, showing low risk of alcohol abuse.      Patient Care Team:  Reggie Johnson MD as PCP - General (Family Medicine)  Jeffery Mccann MD (GASTROENTEROLOGY)  Guy Corbin (Internal Medicine)    Review of Systems   Constitutional: Negative.  Negative for activity change, appetite change, chills and fever.   HENT: Negative.     Eyes: Negative.    Respiratory: Negative.  Negative for shortness of breath.    Cardiovascular: Negative.  Negative for chest pain and palpitations.   Gastrointestinal: Negative.  Negative for abdominal pain.   Genitourinary: Negative.  Negative for dysuria.   Musculoskeletal:  Negative for arthralgias.   Skin: Negative.  Negative for rash.   Allergic/Immunologic: Negative.    Neurological: Negative.        Objective:   Physical Exam  Vitals and nursing note reviewed.   Constitutional:       General: He is not in acute distress.     Appearance: Normal appearance.   HENT:      Head: Normocephalic and atraumatic.      Right Ear: Tympanic membrane and external ear normal.      Left Ear: Tympanic membrane and external ear normal.      Nose: Nose normal.       Mouth/Throat:      Mouth: Mucous membranes are moist.   Eyes:      Extraocular Movements: Extraocular movements intact.      Pupils: Pupils are equal, round, and reactive to light.   Cardiovascular:      Rate and Rhythm: Normal rate and regular rhythm.      Pulses: Normal pulses.           Carotid pulses are 2+ on the right side and 2+ on the left side.       Radial pulses are 2+ on the right side and 2+ on the left side.        Dorsalis pedis pulses are 2+ on the right side and 2+ on the left side.        Posterior tibial pulses are 2+ on the right side and 2+ on the left side.      Heart sounds: Normal heart sounds, S1 normal and S2 normal. No murmur heard.  Pulmonary:      Effort: Pulmonary effort is normal. No respiratory distress.      Breath sounds: Normal breath sounds. No wheezing.   Abdominal:      General: Abdomen is flat. Bowel sounds are normal. There is no distension.      Palpations: Abdomen is soft.      Tenderness: There is no abdominal tenderness.   Musculoskeletal:         General: Normal range of motion.      Cervical back: Normal range of motion and neck supple.      Right lower leg: No edema.      Left lower leg: No edema.   Skin:     General: Skin is warm and dry.      Capillary Refill: Capillary refill takes less than 2 seconds.      Findings: No rash.   Neurological:      General: No focal deficit present.      Mental Status: He is alert and oriented to person, place, and time.   Psychiatric:         Mood and Affect: Mood normal.         Behavior: Behavior normal.         Thought Content: Thought content normal.         Judgment: Judgment normal.         /68   Pulse 78   Resp 14   Ht 6' 2\" (1.88 m)   Wt 225 lb (102.1 kg)   BMI 28.89 kg/m²  Estimated body mass index is 28.89 kg/m² as calculated from the following:    Height as of this encounter: 6' 2\" (1.88 m).    Weight as of this encounter: 225 lb (102.1 kg).    Medicare Hearing Assessment:   Hearing Screening    Screening Method:  Whisper Test  Whisper Test Result: Pass         Visual Acuity:   Right Eye Visual Acuity: Corrected Right Eye Chart Acuity: 20/40   Left Eye Visual Acuity: Corrected Left Eye Chart Acuity: 20/40   Both Eyes Visual Acuity: Corrected Both Eyes Chart Acuity: 20/40   Able To Tolerate Visual Acuity: Yes        Assessment & Plan:   Donald Arzola is a 78 year old male who presents for a Medicare Assessment.     1. Annual physical exam (Primary)  2. Prostate cancer metastatic to lung (HCC)  Overview:  Dx 2008 at [facility], initially XRT, then PSA lazara from 2013 with nodules in lung, and got Lupron 6/2016 and XRT of lung lesion 2019 at [facility]  Pet Scan for prostes 8/2022: 1. Focus of activity in the right prostate bed is compatible with tumor recurrence. 2.  Soft tissue nodules anterior to the right psoas, and anterior to the infrarenal aorta are compatible with metastases. Report Electronically Signed: 8/12/2022 12:43 PM Chace Lares MD   Assessment & Plan:  Guy Corbin M.D. ging at [facility]. Stable, continue present management and continue to monitor for progression   3. Pancytopenia (HCC)  Overview:  Low wbc and low hgb  Assessment & Plan:  2/15/2022: WBC 3.4 (L); HGB 11.6 (L); .0; MCV 95.0 still low hgb and wbc due to cancer. Continue to follow with oncology  4. Stage 3b chronic kidney disease (HCC)  Overview:  GFR in the 50s, normal blood pressure and no diabetes.  History of gout  Assessment & Plan:  Last Glomerular Filtration Rate:  . 2/15/2022: Creatinine 1.15 usually GFR in 40's.  2/15/2022: Creatinine 1.15 CKD etiologies : Hypertension  Plan/Management: Control Hypertension/Diabetes   Orders:  -     TSH W Reflex To Free T4; Future; Expected date: 09/16/2024  5. Low HDL (under 40)  Overview:  HDL 35 2013  Assessment & Plan:  Cholesterol shows Good control. Long term heart-healthy diet and lifestyle discussed and encouraged to reduce risk of cardiovascular disease.  No results found for requested labs  within last 1833 days 8 hours.  No current Cholesterol medication. stable  Continue with current treatment plan   Orders:  -     Comp Metabolic Panel (14); Future; Expected date: 09/16/2024  -     TSH W Reflex To Free T4; Future; Expected date: 09/16/2024  6. Idiopathic chronic gout of multiple sites without tophus  Assessment & Plan:  Gout shows Good control.  Uric Acid: 6.6, done on 3/6/2017.  Last Cr was 1.15 done on 2/15/2022.  Last GFR (REYNA) was 62 done on 2/15/2022.   Orders:  -     Uric Acid; Future; Expected date: 09/16/2024  7. Screening PSA (prostate specific antigen)  8. Screening for viral disease  -     MMR [[id number]]  -     Rubeola(Measles)Antibodies, IGG-Immunity; Future; Expected date: 09/16/2024  -     Mumps Antibodies, IGG-Immunity; Future; Expected date: 09/16/2024  -     Rubella Antibodies, IgG; Future; Expected date: 09/16/2024  9. Thyroid condition  -     TSH W Reflex To Free T4; Future; Expected date: 09/16/2024  10. Encounter for annual health examination    The patient indicates understanding of these issues and agrees to the plan.  Reinforced healthy diet, lifestyle, and exercise.      Return in 1 year (on 9/16/2025) for AWV with chonic condition follow up.     Reggie Johnson MD, 9/16/2024     Supplementary Documentation:   General Health:  In the past six months, have you lost more than 10 pounds without trying?: 2 - No  Has your appetite been poor?: No  Type of Diet: Balanced  How does the patient maintain a good energy level?: Appropriate Exercise;Daily Walks;Stretching  How would you describe your daily physical activity?: Light  How would you describe your current health state?: Good  How do you maintain positive mental well-being?: Social Interaction;Visiting Friends;Visiting Family  On a scale of 0 to 10, with 0 being no pain and 10 being severe pain, what is your pain level?: 0 - (None)  In the past six months, have you experienced urine leakage?: 0-No  At any time do you feel concerned  for the safety/well-being of yourself and/or your children, in your home or elsewhere?: No  Have you had any immunizations at another office such as Influenza, Hepatitis B, Tetanus, or Pneumococcal?: No    Health Maintenance   Topic Date Due    Zoster Vaccines (1 of 2) Never done    Colorectal Cancer Screening  08/04/2020    PSA  02/15/2023    COVID-19 Vaccine (3 - 2023-24 season) 09/01/2024    Annual Physical  09/15/2024    Influenza Vaccine (1) 10/01/2024    Annual Depression Screening  Completed    Fall Risk Screening (Annual)  Completed    Pneumococcal Vaccine: 65+ Years  Completed

## 2024-09-16 NOTE — PATIENT INSTRUCTIONS
[facility]  [facility]  [phone]  [address] SCREENING SCHEDULE   Tests on this list are recommended by your physician but may not be covered, or covered at this frequency, by your insurer.   Please check with your insurance carrier before scheduling to verify coverage.   PREVENTATIVE SERVICES FREQUENCY &  COVERAGE DETAILS LAST COMPLETION DATE   Diabetes Screening    Fasting Blood Sugar / Glucose    One screening every 12 months if never tested or if previously tested but not diagnosed with pre-diabetes   One screening every 6 months if diagnosed with pre-diabetes Lab Results   Component Value Date    GLU 83 02/15/2022        Cardiovascular Disease Screening    Lipid Panel  Cholesterol  Lipoprotein (HDL)  Triglycerides Covered every 5 years for all Medicare beneficiaries without apparent signs or symptoms of cardiovascular disease Lab Results   Component Value Date    CHOLEST 197 03/06/2017    HDL 37 (L) 03/06/2017    LDL 98 03/06/2017    TRIG 310 (H) 03/06/2017         Electrocardiogram (EKG)   Covered if needed at Welcome to Medicare, and non-screening if indicated for medical reasons 09/30/2014      Ultrasound Screening for Abdominal Aortic Aneurysm (AAA) Covered once in a lifetime for one of the following risk factors   • Men who are 65-75 years old and have ever smoked   • Anyone with a family history -     Colorectal Cancer Screening  Covered for ages 50-85; only need ONE of the following:    Colonoscopy   Covered every 10 years    Covered every 2 years if patient is at high risk or previous colonoscopy was abnormal 08/04/2015    Health Maintenance   Topic Date Due   • Colorectal Cancer Screening  08/04/2020       Flexible Sigmoidoscopy   Covered every 4 years -    Fecal Occult Blood Test Covered annually -   Prostate Cancer Screening    Prostate-Specific Antigen (PSA) Annually Lab Results   Component Value Date    PSA 0.21 02/15/2022     Health  Maintenance   Topic Date Due   • PSA  08/22/2025      Immunizations    Influenza Covered once per flu season  Please get every year -  No recommendations at this time    Pneumococcal Each vaccine ([id number] & [id number]) covered once after 65 Prevnar 13: 11/29/2017    [id number]: 07/03/2019     No recommendations at this time    Hepatitis B One screening covered for patients with certain risk factors   -  No recommendations at this time    Tetanus Toxoid Not covered by Medicare Part B unless medically necessary (cut with metal); may be covered with your pharmacy prescription benefits -    Tetanus, Diptheria and Pertusis TD and TDaP Not covered by Medicare Part B -  No recommendations at this time    Zoster Not covered by Medicare Part B; may be covered with your pharmacy  prescription benefits -  Zoster Vaccines(1 of 2) Never done

## 2024-09-23 ENCOUNTER — LAB ENCOUNTER (OUTPATIENT)
Dept: LAB | Age: 79
End: 2024-09-23
Attending: FAMILY MEDICINE
Payer: MEDICARE

## 2024-09-23 DIAGNOSIS — M1A.09X0 IDIOPATHIC CHRONIC GOUT OF MULTIPLE SITES WITHOUT TOPHUS: ICD-10-CM

## 2024-09-23 DIAGNOSIS — Z11.59 SCREENING FOR VIRAL DISEASE: ICD-10-CM

## 2024-09-23 DIAGNOSIS — E78.6 LOW HDL (UNDER 40): ICD-10-CM

## 2024-09-23 DIAGNOSIS — E07.9 THYROID CONDITION: ICD-10-CM

## 2024-09-23 DIAGNOSIS — N18.32 STAGE 3B CHRONIC KIDNEY DISEASE (HCC): ICD-10-CM

## 2024-09-23 LAB
ALBUMIN SERPL-MCNC: 4.6 G/DL (ref 3.2–4.8)
ALBUMIN/GLOB SERPL: 1.8 {RATIO} (ref 1–2)
ALP LIVER SERPL-CCNC: 46 U/L
ALT SERPL-CCNC: 17 U/L
ANION GAP SERPL CALC-SCNC: 4 MMOL/L (ref 0–18)
AST SERPL-CCNC: 17 U/L (ref ?–34)
BILIRUB SERPL-MCNC: 0.6 MG/DL (ref 0.2–1.1)
BUN BLD-MCNC: 24 MG/DL (ref 9–23)
CALCIUM BLD-MCNC: 9.9 MG/DL (ref 8.7–10.4)
CHLORIDE SERPL-SCNC: 107 MMOL/L (ref 98–112)
CO2 SERPL-SCNC: 30 MMOL/L (ref 21–32)
CREAT BLD-MCNC: 1.27 MG/DL
EGFRCR SERPLBLD CKD-EPI 2021: 58 ML/MIN/1.73M2 (ref 60–?)
FASTING STATUS PATIENT QL REPORTED: NO
GLOBULIN PLAS-MCNC: 2.5 G/DL (ref 2–3.5)
GLUCOSE BLD-MCNC: 88 MG/DL (ref 70–99)
OSMOLALITY SERPL CALC.SUM OF ELEC: 295 MOSM/KG (ref 275–295)
POTASSIUM SERPL-SCNC: 4.7 MMOL/L (ref 3.5–5.1)
PROT SERPL-MCNC: 7.1 G/DL (ref 5.7–8.2)
RUBV IGG SER QL: POSITIVE
RUBV IGG SER-ACNC: 338 IU/ML (ref 10–?)
SODIUM SERPL-SCNC: 141 MMOL/L (ref 136–145)
T4 FREE SERPL-MCNC: 1.1 NG/DL (ref 0.8–1.7)
TSI SER-ACNC: 7.52 MIU/ML (ref 0.55–4.78)
URATE SERPL-MCNC: 6.3 MG/DL

## 2024-09-23 PROCEDURE — 86762 RUBELLA ANTIBODY: CPT

## 2024-09-23 PROCEDURE — 86735 MUMPS ANTIBODY: CPT

## 2024-09-23 PROCEDURE — 84439 ASSAY OF FREE THYROXINE: CPT

## 2024-09-23 PROCEDURE — 84443 ASSAY THYROID STIM HORMONE: CPT

## 2024-09-23 PROCEDURE — 86765 RUBEOLA ANTIBODY: CPT

## 2024-09-23 PROCEDURE — 80053 COMPREHEN METABOLIC PANEL: CPT

## 2024-09-23 PROCEDURE — 36415 COLL VENOUS BLD VENIPUNCTURE: CPT

## 2024-09-23 PROCEDURE — 84550 ASSAY OF BLOOD/URIC ACID: CPT

## 2024-09-25 LAB
MEV IGG SER-ACNC: >300 AU/ML (ref 16.5–?)
MUV IGG SER IA-ACNC: 121 AU/ML (ref 11–?)

## 2025-01-14 NOTE — TELEPHONE ENCOUNTER
The original prescription was discontinued on 6/11/2021 by [facility]
Successful reinsertion of 20F reinsertion under fluoroscopic guidance requiring serial dilation. Positioning confirmed with contrast injection. Balloon filled with 10ml sterile water/contrast mixture. Full report to follow.